# Patient Record
Sex: MALE | Race: WHITE | URBAN - METROPOLITAN AREA
[De-identification: names, ages, dates, MRNs, and addresses within clinical notes are randomized per-mention and may not be internally consistent; named-entity substitution may affect disease eponyms.]

---

## 2017-11-20 ENCOUNTER — IMPORTED ENCOUNTER (OUTPATIENT)
Dept: URBAN - METROPOLITAN AREA CLINIC 50 | Facility: CLINIC | Age: 77
End: 2017-11-20

## 2019-12-04 ENCOUNTER — IMPORTED ENCOUNTER (OUTPATIENT)
Dept: URBAN - METROPOLITAN AREA CLINIC 50 | Facility: CLINIC | Age: 79
End: 2019-12-04

## 2019-12-05 ENCOUNTER — IMPORTED ENCOUNTER (OUTPATIENT)
Dept: URBAN - METROPOLITAN AREA CLINIC 50 | Facility: CLINIC | Age: 79
End: 2019-12-05

## 2019-12-10 ENCOUNTER — IMPORTED ENCOUNTER (OUTPATIENT)
Dept: URBAN - METROPOLITAN AREA CLINIC 50 | Facility: CLINIC | Age: 79
End: 2019-12-10

## 2020-01-15 ENCOUNTER — IMPORTED ENCOUNTER (OUTPATIENT)
Dept: URBAN - METROPOLITAN AREA CLINIC 50 | Facility: CLINIC | Age: 80
End: 2020-01-15

## 2020-01-15 NOTE — PATIENT DISCUSSION
"""Long discussion with patient on high order of astigmatism and the correction needed in order to ""

## 2020-01-23 ENCOUNTER — IMPORTED ENCOUNTER (OUTPATIENT)
Dept: URBAN - METROPOLITAN AREA CLINIC 50 | Facility: CLINIC | Age: 80
End: 2020-01-23

## 2020-01-23 NOTE — PATIENT DISCUSSION
"""S/P IOL OD: Sensar AAB00 20.0 (Target: [place]) +Omidria. Continue post operative instructions and drops per schedule.  """

## 2020-01-29 ENCOUNTER — IMPORTED ENCOUNTER (OUTPATIENT)
Dept: URBAN - METROPOLITAN AREA CLINIC 50 | Facility: CLINIC | Age: 80
End: 2020-01-29

## 2020-01-29 NOTE — PATIENT DISCUSSION
"""S/P IOL OD: Sensar AAB00 20.0 (Target: [place]) +Omidria. Continue post operative instructions and drops per schedule.  """

## 2020-01-29 NOTE — PATIENT DISCUSSION
"""[place] with IOL OS: 02/06/2020 Sensar AAB00 20.5 [place]: [facility]

## 2020-02-06 ENCOUNTER — IMPORTED ENCOUNTER (OUTPATIENT)
Dept: URBAN - METROPOLITAN AREA CLINIC 50 | Facility: CLINIC | Age: 80
End: 2020-02-06

## 2020-02-06 NOTE — PATIENT DISCUSSION
"""S/P IOL OS: Sensar AAB00 20.5 (Target: [place]) +Omidria.  Continue post operative instructions ""

## 2020-02-12 ENCOUNTER — IMPORTED ENCOUNTER (OUTPATIENT)
Dept: URBAN - METROPOLITAN AREA CLINIC 50 | Facility: CLINIC | Age: 80
End: 2020-02-12

## 2020-02-12 NOTE — PATIENT DISCUSSION
"""S/P IOL OS: Sensar AAB00 20.5 (Target: [place]) +Omidria.  Continue post operative instructions ""

## 2020-03-10 ENCOUNTER — IMPORTED ENCOUNTER (OUTPATIENT)
Dept: URBAN - METROPOLITAN AREA CLINIC 50 | Facility: CLINIC | Age: 80
End: 2020-03-10

## 2021-03-03 ENCOUNTER — IMPORTED ENCOUNTER (OUTPATIENT)
Dept: URBAN - METROPOLITAN AREA CLINIC 50 | Facility: CLINIC | Age: 81
End: 2021-03-03

## 2021-04-17 ASSESSMENT — VISUAL ACUITY
OS_OTHER: 20/50. 20/100.
OS_CC: 20/25+2
OD_CC: J5
OD_CC: 20/30-
OS_CC: 20/25-1
OD_BAT: 20/50
OS_OTHER: 20/40. 20/70.
OD_CC: J1
OS_SC: 20/40
OS_BAT: 20/50
OD_SC: 20/200
OS_BAT: 20/50
OS_PH: 20/25
OS_CC: J2
OS_CC: 20/400
OD_SC: 20/30
OS_CC: 20/25-2
OS_OTHER: 20/50. 20/50.
OS_SC: 20/50
OD_BAT: 20/60
OD_OTHER: 20/60. 20/100.
OD_PH: 20/30
OS_CC: J5
OD_PH: 20/25
OS_BAT: 20/40
OS_SC: 20/25-2
OS_OTHER: 20/40-. 20/50.
OD_BAT: 20/40
OS_BAT: 20/50
OS_CC: J1@ 16 IN
OD_OTHER: 20/60. 20/100.
OD_CC: 20/30-1
OD_PH: 20/30
OS_CC: J1
OS_PH: 20/25-
OD_BAT: 20/60
OD_CC: 20/30-1
OD_SC: 20/30-
OD_SC: 20/30-
OD_CC: J2
OS_BAT: 20/40-
OS_CC: 20/30-1
OD_OTHER: 20/40. 20/80.
OD_OTHER: 20/50. 20/60.
OD_CC: J1@ 16 IN
OS_OTHER: 20/50. 20/50.
OD_CC: 20/25-1

## 2021-04-17 ASSESSMENT — TONOMETRY
OS_IOP_MMHG: 14
OD_IOP_MMHG: 11
OD_IOP_MMHG: 11
OD_IOP_MMHG: 13
OD_IOP_MMHG: 14
OD_IOP_MMHG: 12
OD_IOP_MMHG: 10
OS_IOP_MMHG: 14
OS_IOP_MMHG: 12
OD_IOP_MMHG: 14
OS_IOP_MMHG: 14
OS_IOP_MMHG: 12
OD_IOP_MMHG: 10
OD_IOP_MMHG: 10

## 2021-08-23 ENCOUNTER — PREPPED CHART (OUTPATIENT)
Dept: URBAN - METROPOLITAN AREA CLINIC 49 | Facility: CLINIC | Age: 81
End: 2021-08-23

## 2021-08-30 ENCOUNTER — DILATED FUNDUS EXAM (OUTPATIENT)
Dept: URBAN - METROPOLITAN AREA CLINIC 49 | Facility: CLINIC | Age: 81
End: 2021-08-30

## 2021-08-30 DIAGNOSIS — H35.361: ICD-10-CM

## 2021-08-30 DIAGNOSIS — H26.493: ICD-10-CM

## 2021-08-30 PROCEDURE — 92014 COMPRE OPH EXAM EST PT 1/>: CPT

## 2021-08-30 PROCEDURE — 92134 CPTRZ OPH DX IMG PST SGM RTA: CPT

## 2021-08-30 ASSESSMENT — VISUAL ACUITY
OU_CC: J2
OS_GLARE: 20/400
OS_GLARE: 20/200
OS_CC: 20/30
OD_CC: 20/30-2
OD_GLARE: >20/400

## 2021-08-30 ASSESSMENT — TONOMETRY
OD_IOP_MMHG: 12
OS_IOP_MMHG: 12

## 2021-09-28 ENCOUNTER — REFRACTION (OUTPATIENT)
Dept: URBAN - METROPOLITAN AREA CLINIC 49 | Facility: CLINIC | Age: 81
End: 2021-09-28

## 2021-09-28 DIAGNOSIS — H52.4: ICD-10-CM

## 2021-09-28 PROCEDURE — 92015 DETERMINE REFRACTIVE STATE: CPT

## 2021-09-28 ASSESSMENT — VISUAL ACUITY
OS_CC: 20/30-2
OD_CC: 20/30-2
OU_CC: 20/30-1
OU_CC: J1

## 2021-10-20 ENCOUNTER — PROBLEM (OUTPATIENT)
Dept: URBAN - METROPOLITAN AREA CLINIC 49 | Facility: CLINIC | Age: 81
End: 2021-10-20

## 2021-10-20 DIAGNOSIS — H02.105: ICD-10-CM

## 2021-10-20 DIAGNOSIS — H00.15: ICD-10-CM

## 2021-10-20 PROCEDURE — 92012 INTRM OPH EXAM EST PATIENT: CPT

## 2021-10-20 RX ORDER — TOBRAMYCIN AND DEXAMETHASONE 1; 3 MG/ML; MG/ML
1 SUSPENSION/ DROPS OPHTHALMIC
Start: 2021-10-20 | End: 2021-10-30

## 2021-10-20 RX ORDER — CEPHALEXIN 500 MG/1
1 CAPSULE ORAL TWICE A DAY
Start: 2021-10-20 | End: 2021-10-30

## 2021-10-20 RX ORDER — PREDNISONE 20MG 20 MG/1
1 TABLET ORAL ONCE A DAY
Start: 2021-10-20 | End: 2021-10-30

## 2021-10-20 ASSESSMENT — TONOMETRY
OS_IOP_MMHG: 10
OD_IOP_MMHG: 11

## 2021-10-20 ASSESSMENT — VISUAL ACUITY
OD_CC: 20/30
OS_CC: 20/30

## 2021-10-20 NOTE — PATIENT DISCUSSION
Start Keflex 500mg BID for 10 days. Start Prednisone 20mg Qd for 10 daty. Start Tobradex OS TID for 10 days. Follow up in 2 weeks.

## 2021-10-20 NOTE — PATIENT DISCUSSION
LLL medial chalazion causing ectropion LLL. Will treat chalazion and see patient back in 2 weeks. If ectropion LLL is not resolved at that time, will consider referral to Dr. Mac Cochran for further evaluation.

## 2021-11-03 ENCOUNTER — 2 WEEK FOLLOW-UP (OUTPATIENT)
Dept: URBAN - METROPOLITAN AREA CLINIC 49 | Facility: CLINIC | Age: 81
End: 2021-11-03

## 2021-11-03 DIAGNOSIS — H00.15: ICD-10-CM

## 2021-11-03 DIAGNOSIS — H02.105: ICD-10-CM

## 2021-11-03 PROCEDURE — 92012 INTRM OPH EXAM EST PATIENT: CPT

## 2021-11-03 ASSESSMENT — TONOMETRY
OD_IOP_MMHG: 10
OS_IOP_MMHG: 10

## 2021-11-03 ASSESSMENT — VISUAL ACUITY
OD_CC: 20/30
OS_CC: 20/30

## 2021-11-03 NOTE — PATIENT DISCUSSION
still present,  recommend consult with Dr Shayy Zurita Recommend patient increase the use of artificial tears to 3-4 times a day.  preferably gel drops.